# Patient Record
Sex: MALE | Race: OTHER | HISPANIC OR LATINO | URBAN - METROPOLITAN AREA
[De-identification: names, ages, dates, MRNs, and addresses within clinical notes are randomized per-mention and may not be internally consistent; named-entity substitution may affect disease eponyms.]

---
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
## 2018-04-01 ENCOUNTER — EMERGENCY (EMERGENCY)
Facility: HOSPITAL | Age: 18
LOS: 0 days | Discharge: HOME | End: 2018-04-01
Attending: EMERGENCY MEDICINE

## 2018-04-01 VITALS
DIASTOLIC BLOOD PRESSURE: 63 MMHG | HEART RATE: 76 BPM | HEIGHT: 69 IN | RESPIRATION RATE: 18 BRPM | TEMPERATURE: 101 F | SYSTOLIC BLOOD PRESSURE: 109 MMHG | WEIGHT: 134.04 LBS

## 2018-04-01 DIAGNOSIS — R50.9 FEVER, UNSPECIFIED: ICD-10-CM

## 2018-04-01 DIAGNOSIS — M54.5 LOW BACK PAIN: ICD-10-CM

## 2018-04-01 DIAGNOSIS — B34.9 VIRAL INFECTION, UNSPECIFIED: ICD-10-CM

## 2018-04-01 LAB
APPEARANCE UR: CLEAR — SIGNIFICANT CHANGE UP
BACTERIA # UR AUTO: SIGNIFICANT CHANGE UP
BILIRUB UR-MCNC: NEGATIVE — SIGNIFICANT CHANGE UP
COD CRY URNS QL: NEGATIVE — SIGNIFICANT CHANGE UP
COLOR SPEC: YELLOW — SIGNIFICANT CHANGE UP
COMMENT - URINE: SIGNIFICANT CHANGE UP
DIFF PNL FLD: (no result)
EPI CELLS # UR: (no result) /HPF
GLUCOSE UR QL: NEGATIVE MG/DL — SIGNIFICANT CHANGE UP
GRAN CASTS # UR COMP ASSIST: NEGATIVE — SIGNIFICANT CHANGE UP
HYALINE CASTS # UR AUTO: NEGATIVE — SIGNIFICANT CHANGE UP
KETONES UR-MCNC: NEGATIVE — SIGNIFICANT CHANGE UP
LEUKOCYTE ESTERASE UR-ACNC: NEGATIVE — SIGNIFICANT CHANGE UP
NITRITE UR-MCNC: NEGATIVE — SIGNIFICANT CHANGE UP
PH UR: 7.5 — SIGNIFICANT CHANGE UP (ref 5–8)
PROT UR-MCNC: NEGATIVE MG/DL — SIGNIFICANT CHANGE UP
RBC CASTS # UR COMP ASSIST: SIGNIFICANT CHANGE UP /HPF
SP GR SPEC: 1.01 — SIGNIFICANT CHANGE UP (ref 1.01–1.03)
TRI-PHOS CRY UR QL COMP ASSIST: NEGATIVE — SIGNIFICANT CHANGE UP
URATE CRY FLD QL MICRO: NEGATIVE — SIGNIFICANT CHANGE UP
UROBILINOGEN FLD QL: 2 MG/DL (ref 0.2–0.2)
WBC UR QL: NEGATIVE — SIGNIFICANT CHANGE UP

## 2018-04-01 RX ORDER — IBUPROFEN 200 MG
600 TABLET ORAL ONCE
Qty: 0 | Refills: 0 | Status: COMPLETED | OUTPATIENT
Start: 2018-04-01 | End: 2018-04-01

## 2018-04-01 RX ADMIN — Medication 600 MILLIGRAM(S): at 22:59

## 2018-04-01 NOTE — ED PROVIDER NOTE - ATTENDING CONTRIBUTION TO CARE
I personally evaluated the patient. I reviewed the Resident’s or Physician Assistant’s note (as assigned above), and agree with the findings and plan except as documented in my note.  Pt with sore throat, cough, fever, and myalgias including to lower back, no urinary symptoms, no travel, one xam vs appreciated, well appearing conj pink sclera anicteric pharynx with minimal erythema no exudate neck supple cor reg lungs cta no w/r/r abd snt no cvat neuro intact, likely viral syndrome, supportive care. Patient counseled regarding conditions which should prompt return.

## 2018-04-01 NOTE — ED PROVIDER NOTE - OBJECTIVE STATEMENT
16 yo M with no pmhx presenting with fever, headache, sore throat myalgias, and left lower back pain that started yesterday. States left lower back pain is worse with movement. No history of kidney stones. No chest pain, sob, neck pain/neck stiffness, nausea, vomiting, abdominal pain, dysuria, hematuria, testicular pain, or increased urinary frequency. Pt reports taking tylenol yesterday with mild relief. Symptoms are mild, with no relieving or aggravating factors.

## 2018-04-01 NOTE — ED PROVIDER NOTE - NS ED ROS FT
Review of Systems:  	•	CONSTITUTIONAL - + fever, no diaphoresis  	•	SKIN - no rash  	•	HEMATOLOGIC - no bleeding, no bruising  	•	EYES - no eye pain, no blurry vision  	•	ENT - no congestion, + sore throat, no ear pain  	•	RESPIRATORY - no shortness of breath, no cough  	•	CARDIAC - no chest pain  	•	GI - no abd pain, no nausea, no vomiting, no diarrhea, no constipation  	•	GENITO-URINARY - no vaginal bleeding, no discharge, no dysuria; no hematuria, no increased urinary frequency  	•	MUSCULOSKELETAL - +myalgia +left lower back pain, no joint paint, no swelling, no redness  	•	NEUROLOGIC - no weakness, + headache, no paresthesias  	•	PSYCH - no anxiety, non suicidal, non homicidal, no hallucination, no depression

## 2019-01-28 NOTE — ED PROVIDER NOTE - PHYSICAL EXAMINATION
-- Message is from the [facility]--    Patient is requesting a medication refill - the medication was not listed on the patient's active medication list.    Prescribing Provider: Dr Ephraim Qureshi    RX Name:Bacloscn    Dose:  20MG  Quantity:  30  Instruction(s):  Take 1 tab by mouth 3 times daily as needed.    Duration: 30 days    Pharmacy  [facility] [address] [place] [address] [address] [facility]    Patient confirmed the above pharmacy as correct?  Yes    Caller Information       Type Contact Phone    01/28/2019 01:17 PM Phone (Incoming) Maryam Cabello (Self) [phone] (M)        Patient states insurance is under  name Werbear please place info under new name.  Alternative phone number: none    Turnaround time given to caller:   \"This message will be sent to [state Provider's name]. The clinical team will fulfill your request as soon as they review your message.\"  
Has not seen you for appointment since 6/22/2018 - LF: 6/22/2018 #30  
Will refill once  
bacloscn 20mg 30ct  
VITAL SIGNS: I have reviewed nursing notes and confirm.  CONSTITUTIONAL: Well-developed; well-nourished; in no acute distress.  SKIN: Skin exam is warm and dry, no acute rash.  HEAD: Normocephalic; atraumatic.  EYES: PERRL, EOM intact; conjunctiva and sclera clear.  ENT: No nasal discharge; airway clear. No erythema or exudate to oropharynx.   NECK: Supple; non tender.  CARD: S1, S2 normal; no murmurs, gallops, or rubs. Regular rate and rhythm.  RESP: Clear to auscultation bilaterally. No wheezes, rales or rhonchi.  ABD: Normal bowel sounds; soft; non-distended; non-tender. No CVA tenderness.  EXT: Normal ROM.   LYMPH: No acute cervical adenopathy.  NEURO: Alert, oriented. Grossly unremarkable. No focal deficits. No meningeal signs.  PSYCH: Cooperative, appropriate.

## 2019-11-11 ENCOUNTER — EMERGENCY (EMERGENCY)
Facility: HOSPITAL | Age: 19
LOS: 0 days | Discharge: HOME | End: 2019-11-11
Attending: EMERGENCY MEDICINE | Admitting: EMERGENCY MEDICINE
Payer: MEDICAID

## 2019-11-11 VITALS
DIASTOLIC BLOOD PRESSURE: 76 MMHG | RESPIRATION RATE: 18 BRPM | SYSTOLIC BLOOD PRESSURE: 130 MMHG | WEIGHT: 130.07 LBS | HEIGHT: 65 IN | OXYGEN SATURATION: 99 % | HEART RATE: 57 BPM | TEMPERATURE: 98 F
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
## 2019-11-11 DIAGNOSIS — S93.421A SPRAIN OF DELTOID LIGAMENT OF RIGHT ANKLE, INITIAL ENCOUNTER: ICD-10-CM
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
## 2019-11-11 PROCEDURE — 73610 X-RAY EXAM OF ANKLE: CPT | Mod: 26,RT

## 2019-11-11 PROCEDURE — 99283 EMERGENCY DEPT VISIT LOW MDM: CPT | Mod: 25

## 2019-11-11 PROCEDURE — 29515 APPLICATION SHORT LEG SPLINT: CPT

## 2019-11-11 RX ORDER — IBUPROFEN 200 MG
600 TABLET ORAL ONCE
Refills: 0 | Status: COMPLETED | OUTPATIENT
Start: 2019-11-11 | End: 2019-11-11

## 2019-11-11 RX ADMIN — Medication 600 MILLIGRAM(S): at 14:58

## 2019-11-11 NOTE — ED PROVIDER NOTE - PHYSICAL EXAMINATION
CONSTITUTIONAL: Well-developed; well-nourished; in no acute distress.   SKIN: warm, dry  EXT: Normal ROM. dorsalis pedis intact bilaterally. no edema. +ttp over right medial malleolus. no proximal tib/fib ttp.   NEURO: Alert, oriented, grossly unremarkable. 5/5 sensation and 5/5 motor strength.

## 2019-11-11 NOTE — ED PROVIDER NOTE - PATIENT PORTAL LINK FT
You can access the FollowMyHealth Patient Portal offered by [facility] by registering at the following website: [email or web address]. By joining [employer]’s FollowMyHealth portal, you will also be able to view your health information using other applications (apps) compatible with our system.

## 2019-11-11 NOTE — ED PROVIDER NOTE - OBJECTIVE STATEMENT
18yo M no PMHx presents to ED s/p right ankle injury. Pt was playing soccer yesterday at 5pm when he twisted his ankle. No other trauma, no fall, no LOC. Denies fevers/chills. Pain is 7/10. Pt has not taken medication for pain control. Pt is unable to bear weight.

## 2019-11-11 NOTE — ED PROVIDER NOTE - NSFOLLOWUPCLINICS_GEN_ALL_ED_FT
[facility] ([facility])  Rehabilitation  [address] [place] [address]  Phone: [phone]  Fax:   Follow Up Time:

## 2019-11-11 NOTE — ED PROVIDER NOTE - NS ED ROS FT
Review of Systems:  CONSTITUTIONAL: No fever, No diaphoresis  SKIN: No rash  MUSCULOSKELETAL: +joint paint, No swelling, No back pain  NEUROLOGIC: No numbness, No focal weakness, No headache, No dizziness  All other systems negative, unless specified in HPI

## 2020-01-29 ENCOUNTER — EMERGENCY (EMERGENCY)
Facility: HOSPITAL | Age: 20
LOS: 0 days | Discharge: HOME | End: 2020-01-29
Admitting: EMERGENCY MEDICINE
Payer: OTHER MISCELLANEOUS

## 2020-01-29 VITALS
SYSTOLIC BLOOD PRESSURE: 130 MMHG | TEMPERATURE: 98 F | DIASTOLIC BLOOD PRESSURE: 75 MMHG | OXYGEN SATURATION: 98 % | HEART RATE: 60 BPM | RESPIRATION RATE: 20 BRPM
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
## 2020-01-29 DIAGNOSIS — M25.579 PAIN IN UNSPECIFIED ANKLE AND JOINTS OF UNSPECIFIED FOOT: ICD-10-CM

## 2020-01-29 DIAGNOSIS — S93.401A SPRAIN OF UNSPECIFIED LIGAMENT OF RIGHT ANKLE, INITIAL ENCOUNTER: ICD-10-CM

## 2020-01-29 PROCEDURE — 99283 EMERGENCY DEPT VISIT LOW MDM: CPT

## 2020-01-29 PROCEDURE — 73610 X-RAY EXAM OF ANKLE: CPT | Mod: 26,RT

## 2020-01-29 NOTE — ED ADULT TRIAGE NOTE - CHIEF COMPLAINT QUOTE
pt c/o right ankle pain after dropping "studs" on foot at work today. pt states he had injury to same ankle approximately 1 month ago and brace was in place prior to todays injury.

## 2020-01-29 NOTE — ED PROVIDER NOTE - PATIENT PORTAL LINK FT
You can access the FollowMyHealth Patient Portal offered by [facility] by registering at the following website: [email or web address]. By joining "[email or web address]"’s FollowMyHealth portal, you will also be able to view your health information using other applications (apps) compatible with our system.

## 2020-01-29 NOTE — ED PROVIDER NOTE - MUSCULOSKELETAL, MLM
Right ankle/foot:  FROM, no tenderness to foot, (+) tenderness lateral ankle; no motor or sensory deficit, pedal pulses 2+

## 2020-01-29 NOTE — ED PROVIDER NOTE - CARE PROVIDER_API CALL
Bhargav Bennett (MD)  Orthopaedic Surgery  [address] [place] [address]  Phone: [phone]  Fax: [phone]  Follow Up Time: 1-3 Days

## 2020-01-29 NOTE — ED PROVIDER NOTE - CLINICAL SUMMARY MEDICAL DECISION MAKING FREE TEXT BOX
JOZEF; pt refused splint and crutches- has crutches home and wishes to use ankle ACE he purchased; ortho referral; pt will follow up with PCP in 2-3 days; any new or worsening symptoms, pt will return to ER.

## 2020-01-29 NOTE — ED PROVIDER NOTE - NSFOLLOWUPINSTRUCTIONS_ED_ALL_ED_FT
Ankle Sprain     An ankle sprain is a stretch or tear in one of the tough tissues (ligaments) that connect the bones in your ankle. An ankle sprain can happen when the ankle rolls outward (inversion sprain) or inward (eversion sprain).  What are the causes?  This condition is caused by rolling or twisting the ankle.  What increases the risk?  You are more likely to develop this condition if you play sports.  What are the signs or symptoms?  Symptoms of this condition include:  Pain in your ankle. Swelling. Bruising. This may happen right after you sprain your ankle or 1–2 days later.Trouble standing or walking.How is this diagnosed?  This condition is diagnosed with:  A physical exam. During the exam, your doctor will press on certain parts of your foot and ankle and try to move them in certain ways.X-ray imaging. These may be taken to see how bad the sprain is and to check for broken bones.How is this treated?  This condition may be treated with:  A brace or splint. This is used to keep the ankle from moving until it heals.An elastic bandage. This is used to support the ankle.Crutches.Pain medicine.Surgery. This may be needed if the sprain is very bad.Physical therapy. This may help to improve movement in the ankle.Follow these instructions at home:  If you have a brace or a splint:     Wear the brace or splint as told by your doctor. Remove it only as told by your doctor.Loosen the brace or splint if your toes:  Tingle. Lose feeling (become numb).Turn cold and blue.Keep the brace or splint clean.If the brace or splint is not waterproof:  Do not let it get wet.Cover it with a watertight covering when you take a bath or a shower.If you have an elastic bandage (dressing):     Remove it to shower or bathe. Try not to move your ankle much, but wiggle your toes from time to time. This helps to prevent swelling. Adjust the dressing if it feels too tight.Loosen the dressing if your foot:   Loses feeling.Tingles.Becomes cold and blue.Managing pain, stiffness, and swelling        Take over-the-counter and prescription medicines only as told by doctor.For 2–3 days, keep your ankle raised (elevated) above the level of your heart.If told, put ice on the injured area:  If you have a removable brace or splint, remove it as told by your doctor.Put ice in a plastic bag. Place a towel between your skin and the bag. Leave the ice on for 20 minutes, 2–3 times a day.General instructions     Rest your ankle.Do not use your injured leg to support your body weight until your doctor says that you can. Use crutches as told by your doctor.Do not use any products that contain nicotine or tobacco, such as cigarettes, e-cigarettes, and chewing tobacco. If you need help quitting, ask your doctor.Keep all follow-up visits as told by your doctor.Contact a doctor if:  Your bruises or swelling are quickly getting worse.Your pain does not get better after you take medicine.Get help right away if:  You cannot feel your toes or foot.Your foot or toes look blue.You have very bad pain that gets worse.Summary  An ankle sprain is a stretch or tear in one of the tough tissues (ligaments) that connect the bones in your ankle.This condition is caused by rolling or twisting the ankle.Symptoms include pain, swelling, bruising, and trouble walking.To help with pain and swelling, put ice on the injured ankle, raise your ankle above the level of your heart, and use an elastic bandage. Also, rest as told by your doctor.Keep all follow-up visits as told by your doctor. This is important.This information is not intended to replace advice given to you by your health care provider. Make sure you discuss any questions you have with your health care provider.    Document Released: 06/05/2009 Document Revised: 05/14/2019 Document Reviewed: 05/14/2019  [employer] Patient Education © 2019 [employer]

## 2020-01-29 NOTE — ED PROVIDER NOTE - NSFOLLOWUPCLINICS_GEN_ALL_ED_FT
[facility]  Orthpedic  [address], [place]   Phone: [phone]  Fax:   Follow Up Time: 1-3 Days

## 2020-01-29 NOTE — ED PROVIDER NOTE - OBJECTIVE STATEMENT
19 y.o. male with a PMH of Right ankle sprain 2 months ago c/o "twisting" Right ankle at work this morning while carrying boxes.  Denies fall, extremity weakness/paresthesias.  No other injuries or complaints.

## 2021-09-01 NOTE — ED ADULT NURSE NOTE - NSSUHOSCREENINGYN_ED_ALL_ED
Assessment done per SGNA guidelines. Breathing non-labored, skin warm and dry.      Yes - the patient is able to be screened

## 2024-06-06 ENCOUNTER — EMERGENCY (EMERGENCY)
Facility: HOSPITAL | Age: 24
LOS: 0 days | Discharge: HOME | End: 2024-06-07
Attending: EMERGENCY MEDICINE
Payer: SELF-PAY

## 2024-06-06 VITALS
OXYGEN SATURATION: 100 % | RESPIRATION RATE: 20 BRPM | DIASTOLIC BLOOD PRESSURE: 67 MMHG | HEART RATE: 60 BPM | WEIGHT: 134.92 LBS | HEIGHT: 67 IN | SYSTOLIC BLOOD PRESSURE: 126 MMHG | TEMPERATURE: 98 F
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
## 2024-06-06 DIAGNOSIS — S61.411A LACERATION WITHOUT FOREIGN BODY OF RIGHT HAND, INITIAL ENCOUNTER: ICD-10-CM

## 2024-06-06 DIAGNOSIS — Z23 ENCOUNTER FOR IMMUNIZATION: ICD-10-CM

## 2024-06-06 PROCEDURE — 99284 EMERGENCY DEPT VISIT MOD MDM: CPT | Mod: 25

## 2024-06-06 PROCEDURE — 90715 TDAP VACCINE 7 YRS/> IM: CPT

## 2024-06-06 PROCEDURE — 90471 IMMUNIZATION ADMIN: CPT

## 2024-06-06 PROCEDURE — 99283 EMERGENCY DEPT VISIT LOW MDM: CPT | Mod: 25

## 2024-06-06 PROCEDURE — 12002 RPR S/N/AX/GEN/TRNK2.6-7.5CM: CPT

## 2024-06-06 PROCEDURE — 99053 MED SERV 10PM-8AM 24 HR FAC: CPT

## 2024-06-06 RX ORDER — TETANUS TOXOID, REDUCED DIPHTHERIA TOXOID AND ACELLULAR PERTUSSIS VACCINE, ADSORBED 5; 2.5; 8; 8; 2.5 [IU]/.5ML; [IU]/.5ML; UG/.5ML; UG/.5ML; UG/.5ML
0.5 SUSPENSION INTRAMUSCULAR ONCE
Refills: 0 | Status: COMPLETED | OUTPATIENT
Start: 2024-06-06 | End: 2024-06-06

## 2024-06-06 RX ORDER — TETANUS AND DIPHTHERIA TOXOIDS ADSORBED 2; 2 [LF]/.5ML; [LF]/.5ML
0.5 INJECTION INTRAMUSCULAR ONCE
Refills: 0 | Status: DISCONTINUED | OUTPATIENT
Start: 2024-06-06 | End: 2024-06-06

## 2024-06-06 RX ADMIN — TETANUS TOXOID, REDUCED DIPHTHERIA TOXOID AND ACELLULAR PERTUSSIS VACCINE, ADSORBED 0.5 MILLILITER(S): 5; 2.5; 8; 8; 2.5 SUSPENSION INTRAMUSCULAR at 23:48

## 2024-06-06 NOTE — ED ADULT NURSE NOTE - CAS DISCH TRANSFER METHOD
[General Appearance - Well Developed] : well developed [General Appearance - Well Nourished] : well nourished [General Appearance - In No Acute Distress] : no acute distress [Normal Conjunctiva] : the conjunctiva exhibited no abnormalities [Normal Oral Mucosa] : normal oral mucosa [Normal Oropharynx] : normal oropharynx [Normal Jugular Venous V Waves Present] : normal jugular venous V waves present [] : no respiratory distress [Respiration, Rhythm And Depth] : normal respiratory rhythm and effort [Auscultation Breath Sounds / Voice Sounds] : lungs were clear to auscultation bilaterally [Heart Rate And Rhythm] : heart rate and rhythm were normal [Heart Sounds] : normal S1 and S2 [Edema] : no peripheral edema present [Abdomen Soft] : soft [Abnormal Walk] : normal gait [Nail Clubbing] : no clubbing of the fingernails [Cyanosis, Localized] : no localized cyanosis [Oriented To Time, Place, And Person] : oriented to person, place, and time [Impaired Insight] : insight and judgment were intact [FreeTextEntry1] : tearful Private car

## 2024-06-07 RX ORDER — CEPHALEXIN 500 MG
1 CAPSULE ORAL
Qty: 20 | Refills: 0
Start: 2024-06-07 | End: 2024-06-11

## 2024-06-07 NOTE — ED PROVIDER NOTE - PHYSICAL EXAMINATION
CONSTITUTIONAL: NAD  HEAD: NCAT  EYES: NL inspection  EXT: Right palm with 3 cm deep laceration on hypothenar eminence, not well-approximated, appears to have skin avulsion there as well.  NEURO: Grossly unremarkable  PSYCH: Cooperative, appropriate.

## 2024-06-07 NOTE — ED PROVIDER NOTE - NSFOLLOWUPINSTRUCTIONS_ED_ALL_ED_FT
Please follow-up with PCP in 1 to 3 days.Take antibiotics as prescribed.  Please change the dressing as needed and if ever starts bleeding again, hold pressure for 10 to 15 minutes. Return precautions explained in full to patient/family.    Laceration    A laceration is a cut that goes through all of the layers of the skin and into the tissue that is right under the skin. Some lacerations heal on their own. Others need to be closed with skin adhesive strips, skin glue, stitches (sutures), or staples. Proper laceration care minimizes the risk of infection and helps the laceration to heal better.  If non-absorbable stitches or staples have been placed, they must be taken out within the time frame instructed by your healthcare provider.    SEEK IMMEDIATE MEDICAL CARE IF YOU HAVE ANY OF THE FOLLOWING SYMPTOMS: swelling around the wound, worsening pain, drainage from the wound, red streaking going away from your wound, inability to move finger or toe near the laceration, or discoloration of skin near the laceration. Please follow-up with PCP in 1 to 3 days.Take antibiotics as prescribed.  Please change the dressing as needed and if ever starts bleeding again, hold pressure for 10 to 15 minutes. Return precautions explained in full to patient/family.    Suture removal in 10-14 days.     Laceration    A laceration is a cut that goes through all of the layers of the skin and into the tissue that is right under the skin. Some lacerations heal on their own. Others need to be closed with skin adhesive strips, skin glue, stitches (sutures), or staples. Proper laceration care minimizes the risk of infection and helps the laceration to heal better.  If non-absorbable stitches or staples have been placed, they must be taken out within the time frame instructed by your healthcare provider.    SEEK IMMEDIATE MEDICAL CARE IF YOU HAVE ANY OF THE FOLLOWING SYMPTOMS: swelling around the wound, worsening pain, drainage from the wound, red streaking going away from your wound, inability to move finger or toe near the laceration, or discoloration of skin near the laceration.

## 2024-06-07 NOTE — ED PROVIDER NOTE - ATTENDING CONTRIBUTION TO CARE
22 yo M presents with laceration to the palm of right hand sustained earlier today on a broken porcelain toilet. needs Tetanus, on exam pt in NAD AAO x 3, + 3 cm laceration with surrounding abrasion and skin avulsion to the right palm , + bleeding, no fb, good ROM, sensation intact

## 2024-06-07 NOTE — ED PROVIDER NOTE - CLINICAL SUMMARY MEDICAL DECISION MAKING FREE TEXT BOX
Wound care and repair.  Wound edges approximated the best we can. Pt with abrasion to skin edges with bleeding, Sutures placed. wound dressed.  Pt instructed on wound care.  will start prophylactic abx. Pt placed in wrist guard to prevent excessive movement. will follow up wuth PMD, Suture removal, 10-14 days

## 2024-06-07 NOTE — ED PROVIDER NOTE - PATIENT PORTAL LINK FT
You can access the FollowMyHealth Patient Portal offered by [facility] by registering at the following website: [email or web address]. By joining [employer]’s FollowMyHealth portal, you will also be able to view your health information using other applications (apps) compatible with our system.

## 2024-06-07 NOTE — ED PROVIDER NOTE - CARE PLAN
Principal Discharge DX:	Laceration of palm, initial encounter  Secondary Diagnosis:	Need for tetanus booster   1

## 2024-06-07 NOTE — ED PROVIDER NOTE - OBJECTIVE STATEMENT
23-year-old male notable past medical history coming in with complaints of lacerations.  Patient reports that he was working on the toilet at about 1 PM earlier today when he sliced his right hand on the jagged edge, noted that there was no foreign body there.  Has been working yesterday while covered up, and now because there is still bleeding.  Unknown last tetanus.  Reports full range of motion of all fingers and wrist